# Patient Record
Sex: MALE | Race: WHITE | Employment: STUDENT | URBAN - NONMETROPOLITAN AREA
[De-identification: names, ages, dates, MRNs, and addresses within clinical notes are randomized per-mention and may not be internally consistent; named-entity substitution may affect disease eponyms.]

---
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
## 2025-06-19 NOTE — ED PROVIDER NOTES
To Dr. Kashinath please advise   [facility]  Urgent Care Encounter       CHIEF COMPLAINT       Chief Complaint   Patient presents with    Other     bumps on tongue    Nasal Congestion       Nurses Notes reviewed and I agree except as noted in the HPI. HISTORY OF PRESENT ILLNESS   Osei Doe is a 15 y.o. male who presents for bumps on tongue. Patient states he had viral-like illness last week and had several episodes of emesis, had nasal congestion and had some blood streaks in nasal secretions when he blew his nose. The symptoms have all improved. Patient states over the last few days he has noticed some bumps on the back of his tongue. The patient was with his father and is currently with his mother. She has concerns about the bumps noted on the back of the tongue and is here for evaluation of this. The patient reports the bumps on the back of his tongue seem to be better than they were yesterday and currently are not painful. He states he has just never seen these before. HPI    REVIEW OF SYSTEMS     Review of Systems   Constitutional: Negative for fatigue and fever. HENT: Positive for mouth sores (bumps on back of tongue). Respiratory: Negative for cough and shortness of breath. Gastrointestinal: Positive for vomiting (last week, currently improved). Neurological: Negative for dizziness and headaches. Psychiatric/Behavioral: Negative for behavioral problems. PAST MEDICAL HISTORY         Diagnosis Date    Pneumonia     at 5 months and at age 7    Strep throat        SURGICALHISTORY     Patient  has a past surgical history that includes laparoscopic appendectomy (2/12/2016). CURRENT MEDICATIONS       Discharge Medication List as of 2/12/2022  4:26 PM      CONTINUE these medications which have NOT CHANGED    Details   OLIVE LEAF EXTRACT PO Take by mouth             ALLERGIES     Patient is has No Known Allergies.     Patients   There is no immunization history on file for this patient. FAMILY HISTORY     Patient's family history includes Cancer in his maternal grandfather and paternal grandfather; Heart Disease in his maternal grandfather. SOCIAL HISTORY     Patient  reports that he has quit smoking. He has never used smokeless tobacco. He reports current alcohol use. He reports that he does not use drugs. PHYSICAL EXAM     ED TRIAGE VITALS  BP: 132/67, Temp: 98.2 °F (36.8 °C), Heart Rate: 95, Resp: 16, SpO2: 98 %,Estimated body mass index is 15.89 kg/m² as calculated from the following:    Height as of 8/15/16: 4' 10.75\" (1.492 m). Weight as of 8/15/16: 78 lb (35.4 kg). ,No LMP for male patient. Physical Exam  Constitutional:       General: He is not in acute distress. Appearance: He is normal weight. He is not ill-appearing. HENT:      Head: Normocephalic. Nose: Rhinorrhea present. No congestion. Mouth/Throat:      Mouth: Mucous membranes are moist.      Tongue: No lesions. Tongue does not deviate from midline. Palate: No mass and lesions. Pharynx: Oropharynx is clear. Uvula midline. No oropharyngeal exudate or uvula swelling. Tonsils: No tonsillar exudate or tonsillar abscesses. 0 on the right. 0 on the left. Comments: Hypertrophic papillae noted on the back of the tongue. No other oral lesions noted. No ulcerations  Pulmonary:      Effort: Pulmonary effort is normal.   Musculoskeletal:      Comments: Hands and feet without any lesions   Skin:     General: Skin is warm and dry. Capillary Refill: Capillary refill takes less than 2 seconds. Neurological:      General: No focal deficit present. Mental Status: He is alert. Psychiatric:         Mood and Affect: Mood normal.         Behavior: Behavior normal.         DIAGNOSTIC RESULTS     Labs:No results found for this visit on 02/12/22.     IMAGING:    No orders to display         EKG:      URGENT CARE COURSE:     Vitals:    02/12/22 1614   BP: 132/67   Pulse: 95   Resp: 16 Temp: 98.2 °F (36.8 °C)   TempSrc: Temporal   SpO2: 98%   Weight: 117 lb (53.1 kg)       Medications - No data to display         PROCEDURES:  None    FINAL IMPRESSION      1. Hypertrophy of tongue papillae    2. Viral infection          DISPOSITION/ PLAN     Presents for concerns for elevated bumps on the back of his tongue. These are likely hypertrophic papillae. Advised this could be response to his recent viral illness or possibly still irritation from recent vomiting. Appears to be no infection. There are no lesions on the hands or feet concerning for hand-foot-and-mouth disease. No lesions on the gums or soft palate. Patient will be discharged with no medications. Advised this will likely be self-limiting. Advise follow-up family doctor if no improvement in 7 to 10 days.       PATIENT REFERRED TO:  Joe Mayfield MD  [address] [facility] [id number]      DISCHARGE MEDICATIONS:  Discharge Medication List as of 2/12/2022  4:26 PM          Discharge Medication List as of 2/12/2022  4:26 PM          Discharge Medication List as of 2/12/2022  4:26 PM          JANES Pillai CNP    (Please note that portions of this note were completed with a voice recognition program. Efforts were made to edit the dictations but occasionally words are mis-transcribed.)          JANES Pillai CNP  02/12/22 0805